# Patient Record
Sex: MALE | Race: WHITE | URBAN - METROPOLITAN AREA
[De-identification: names, ages, dates, MRNs, and addresses within clinical notes are randomized per-mention and may not be internally consistent; named-entity substitution may affect disease eponyms.]

---

## 2022-08-22 ENCOUNTER — APPOINTMENT (RX ONLY)
Dept: URBAN - METROPOLITAN AREA CLINIC 328 | Facility: CLINIC | Age: 47
Setting detail: DERMATOLOGY
End: 2022-08-22

## 2022-08-22 DIAGNOSIS — L57.3 POIKILODERMA OF CIVATTE: ICD-10-CM

## 2022-08-22 PROCEDURE — ? IN-HOUSE DISPENSING PHARMACY

## 2022-08-22 PROCEDURE — ? COUNSELING

## 2022-08-22 PROCEDURE — 99202 OFFICE O/P NEW SF 15 MIN: CPT

## 2022-08-22 ASSESSMENT — LOCATION SIMPLE DESCRIPTION DERM: LOCATION SIMPLE: NECK

## 2022-08-22 ASSESSMENT — LOCATION ZONE DERM: LOCATION ZONE: NECK

## 2022-08-22 ASSESSMENT — LOCATION DETAILED DESCRIPTION DERM: LOCATION DETAILED: LEFT CENTRAL LATERAL NECK

## 2022-08-22 NOTE — PROCEDURE: IN-HOUSE DISPENSING PHARMACY
Product 46 Refills: 0
Render Refills If Set To 0: Yes
Product 21 Unit Type: capsules
Product 12 Price/Unit (In Dollars): 60
Product 57 Unit Type: mg
Product 7 Amount/Unit (Numbers Only): 1
Product 24 Price/Unit (In Dollars): 10
Product 3 Price/Unit (In Dollars): 40
Name Of Product 10: #10 Fungal Dermatitis Cream
Product 5 Application Directions: For Warts/MC- apply to the areas 2-3 times weekly\\nFor (Pre)Skin Cancer- apply nightly for 2-6 weeks or as directed by provider.
Product 15 Price/Unit (In Dollars): 30
Name Of Product 1: #1 Acne Gel
Name Of Product 22: #22 Prednisone 20mg
Product 12 Refills: 3
Product 17 Application Directions: Apply to affected area nightly.
Product 5 Unit Type: bottle(s)
Product 8 Price/Unit (In Dollars): 35
Product 20 Price/Unit (In Dollars): 15
Product 10 Application Directions: Apply to the affected area twice daily for 10 days.
Name Of Product 6: #6 Alopecia Topical Solution
Product 22 Application Directions: Use as directed by provider.
Product 1 Application Directions: Apply to the face twice daily (if another rx is being prescribed it will be applied in the AM)
Name Of Product 18: #18 Bactrim
Product 22 Unit Type: tablets
Product 4 Price/Unit (In Dollars): 55
Product 11 Price/Unit (In Dollars): 20
Product 2 Price/Unit (In Dollars): 50
Product 13 Application Directions: Apply to the face twice daily or as directed by provider.
Name Of Product 9: #9 Dermatitis Cream
Product 14 [place]/Unit (In Dollars): 45
Product 4 Application Directions: Apply to the face at bedtime or as directed by provider.
Detail Level: Zone
Name Of Product 21: #21 Minocycline
Product 16 Application Directions: Apply to the affected area 1-2 times daily or as directed by provider.
Product 23 Amount/Unit (Numbers Only): 5
Name Of Product 13: #13 Rosacea Cream
Product 9 Application Directions: Apply to the affected area three times weekly.
Name Of Product 5: #5 Actinic Keratosis (Gel)
Product 21 Application Directions: Take one tablet twice daily with food.
Render Product Pricing In Note: No
Name Of Product 17: #17 Xerosis Gel
Name Of Product 3: #3 Acne Gel Combo
Product 19 Application Directions: Take one tablet twice daily with food or as directed by provider.
Name Of Product 15: #15 Anti Fungal Shampoo
Product 12 Application Directions: Apply to the face three times weekly at night for two months only. Take a two month break and then continue for two months again. (On your office months, you can apply Lytera daily)
Name Of Product 8: #8 Anti Fungal Cream
Product 3 Application Directions: Apply to the face once daily AM or PM or as directed by provider.
Product 24 Application Directions: Take one tablet twice daily or as directed by provider.
Name Of Product 20: #20 Loratadine
Product 15 Application Directions: Apply to the scalp 2-3 times weekly. Can be applied every other shampoo as well.
Product 22 Amount/Unit (Numbers Only): 18
Product 8 Application Directions: Apply to the affected area twice daily.
Name Of Product 4: #4 Acne Gel
Product 20 Application Directions: Take one tablet daily.
Name Of Product 16: #16 Dermatitis Topical Solution
Product 6 Application Directions: Apply to the scalp three times weekly
Name Of Product 11: #11 Antibacterial Ointment
Name Of Product 2: #2 Acne Moisturizing Cream
Name Of Product 23: #23 Prednisone 50mg
Name Of Product 14: #14 Rosacea Silicone Gel
Product 11 Application Directions: Apply twice daily to wound.
Name Of Product 7: #7 Anti Fungal Nail Solution
Product 2 Application Directions: Apply to the face at bedtime. Start every other night then increase usage depending on tolerance.
Name Of Product 19: #19 Doxycycline
Product 14 Application Directions: Apply to the face once daily AM or PM
Name Of Product 12: #12 Melasma Emulsion
Product 7 Application Directions: Apply to the nails daily. Please do not apply socks for about5 hours (best if applied at night) Remove once weekly then start again.
Name Of Product 24: #24 Spironolactone

## 2022-11-07 ENCOUNTER — APPOINTMENT (RX ONLY)
Dept: URBAN - METROPOLITAN AREA CLINIC 328 | Facility: CLINIC | Age: 47
Setting detail: DERMATOLOGY
End: 2022-11-07

## 2022-11-07 DIAGNOSIS — L57.3 POIKILODERMA OF CIVATTE: ICD-10-CM | Status: INADEQUATELY CONTROLLED

## 2022-11-07 PROCEDURE — ? RECOMMENDATIONS

## 2022-11-07 PROCEDURE — 99212 OFFICE O/P EST SF 10 MIN: CPT

## 2022-11-07 PROCEDURE — ? COUNSELING

## 2022-11-07 ASSESSMENT — LOCATION ZONE DERM: LOCATION ZONE: NECK

## 2022-11-07 ASSESSMENT — LOCATION DETAILED DESCRIPTION DERM: LOCATION DETAILED: LEFT CENTRAL LATERAL NECK

## 2022-11-07 ASSESSMENT — LOCATION SIMPLE DESCRIPTION DERM: LOCATION SIMPLE: NECK

## 2022-11-07 NOTE — PROCEDURE: RECOMMENDATIONS
Recommendation Preamble: The following recommendations were made during the visit:use #12 twice a week for 6 weeks
Render Risk Assessment In Note?: no
Detail Level: Simple